# Patient Record
Sex: MALE | Race: WHITE | NOT HISPANIC OR LATINO | Employment: OTHER | URBAN - METROPOLITAN AREA
[De-identification: names, ages, dates, MRNs, and addresses within clinical notes are randomized per-mention and may not be internally consistent; named-entity substitution may affect disease eponyms.]

---

## 2021-09-13 ENCOUNTER — TELEPHONE (OUTPATIENT)
Dept: NEUROSURGERY | Facility: CLINIC | Age: 71
End: 2021-09-13

## 2021-09-13 NOTE — TELEPHONE ENCOUNTER
Provider: NICOLE  Caller: AN RAMIREZ  Relationship to Patient: SELF  Pharmacy:   Phone Number: [phone]  Reason for Call: NEW ISSUE WITH BACK  When was the patient last seen: 2016  When did it start: 1 WEEKS AGO  Where is it located: LOW BACK WITH SCIATICA    PT CALLED TO SCHED APPT WITH DR. RIVERA.  BACK PAIN HAS STATED BACK UP ABOUT 1 WEEKS AGO.  PT HAS NOT HAD ANY NEW IMAGING, HAS NOT SEEN PCP OR ANYONE ELSE FOR THIS.      INFORMED PT THAT BECAUSE IT HAS BEEN OVER 3 YEARS SINCE BEING SEEN BY DR. RIVERA INSURANCE WILL REQUIRE A REFERRAL FROM PCP, ECT.  PT STATES WILL CONTACT PCP TO GET REFERRAL FROM PCP.  WILL TAKE CARE OF THIS TODAY.    I DID NOT GET TO OFFER TO SET UP A REFERAL.      THANK YOU

## 2021-11-30 ENCOUNTER — OFFICE VISIT (OUTPATIENT)
Dept: NEUROSURGERY | Facility: CLINIC | Age: 71
End: 2021-11-30

## 2021-11-30 VITALS — WEIGHT: 171.4 LBS | TEMPERATURE: 97 F | BODY MASS INDEX: 25.39 KG/M2 | HEIGHT: 69 IN

## 2021-11-30 DIAGNOSIS — M51.16 LUMBAR DISC HERNIATION WITH RADICULOPATHY: Primary | ICD-10-CM

## 2021-11-30 DIAGNOSIS — M51.36 DDD (DEGENERATIVE DISC DISEASE), LUMBAR: ICD-10-CM

## 2021-11-30 DIAGNOSIS — M54.9 MECHANICAL BACK PAIN: ICD-10-CM

## 2021-11-30 PROBLEM — M47.816 DEGENERATIVE ARTHRITIS OF LUMBAR SPINE: Status: ACTIVE | Noted: 2021-11-30

## 2021-11-30 PROBLEM — M54.50 CHRONIC LBP: Status: ACTIVE | Noted: 2021-11-30

## 2021-11-30 PROBLEM — R20.0 NUMBNESS IN FEET: Status: ACTIVE | Noted: 2021-11-30

## 2021-11-30 PROBLEM — G89.29 CHRONIC LBP: Status: ACTIVE | Noted: 2021-11-30

## 2021-11-30 PROCEDURE — 99204 OFFICE O/P NEW MOD 45 MIN: CPT | Performed by: NEUROLOGICAL SURGERY

## 2021-11-30 RX ORDER — CILOSTAZOL 100 MG/1
TABLET ORAL
COMMUNITY
Start: 2021-10-29

## 2021-11-30 RX ORDER — HYDRALAZINE HYDROCHLORIDE 25 MG/1
TABLET, FILM COATED ORAL
COMMUNITY
Start: 2021-10-29

## 2021-11-30 RX ORDER — GABAPENTIN 300 MG/1
300 CAPSULE ORAL TAKE AS DIRECTED
Qty: 90 CAPSULE | Refills: 0 | Status: SHIPPED | OUTPATIENT
Start: 2021-11-30 | End: 2021-12-29

## 2021-11-30 RX ORDER — CLOPIDOGREL BISULFATE 75 MG/1
TABLET ORAL
COMMUNITY
Start: 2021-09-02

## 2021-11-30 RX ORDER — BACLOFEN 20 MG/1
TABLET ORAL
COMMUNITY
Start: 2021-11-03

## 2021-11-30 RX ORDER — LABETALOL 100 MG/1
TABLET, FILM COATED ORAL
COMMUNITY
Start: 2021-10-29

## 2021-11-30 RX ORDER — LOSARTAN POTASSIUM 50 MG/1
TABLET ORAL
COMMUNITY
Start: 2021-10-20 | End: 2022-02-09 | Stop reason: DRUGHIGH

## 2021-11-30 NOTE — PROGRESS NOTES
Patient: Mehul Darling II  : 1950    Primary Care Provider: Stephon Harris DO    Requesting Provider: As above        History    Chief Complaint: Low back and left lower extremity pain.    History of Present Illness: Mr. Darling is a 71-year-old retired gentleman who is known to my service.  On 3/8/2010 he underwent left L5-S1 discectomy.  On 10/22/2015 he underwent right L4-5 discectomy.  He now describes a 6-month history of low back pain that extends into the back of his left leg to the foot.  He has some weakness, numbness, burning in his foot.  Symptoms are worse with bending, walking, lying down.  There is no position that makes him feel better.  He denies any bowel or bladder dysfunction.    Review of Systems   Constitutional: Negative for activity change, appetite change, chills, diaphoresis, fatigue, fever and unexpected weight change.   HENT: Positive for dental problem, drooling, sinus pressure, sneezing and sore throat. Negative for congestion, ear discharge, ear pain, facial swelling, hearing loss, mouth sores, nosebleeds, postnasal drip, rhinorrhea, tinnitus, trouble swallowing and voice change.    Eyes: Negative for photophobia, pain, discharge, redness, itching and visual disturbance.   Respiratory: Positive for apnea. Negative for cough, choking, chest tightness, shortness of breath, wheezing and stridor.    Cardiovascular: Negative for chest pain, palpitations and leg swelling.   Gastrointestinal: Positive for diarrhea. Negative for abdominal distention, abdominal pain, anal bleeding, blood in stool, constipation, nausea, rectal pain and vomiting.   Endocrine: Positive for cold intolerance and polydipsia. Negative for heat intolerance, polyphagia and polyuria.   Genitourinary: Negative for decreased urine volume, difficulty urinating, dysuria, enuresis, flank pain, frequency, genital sores, hematuria and urgency.   Musculoskeletal: Positive for back pain. Negative for arthralgias, gait  "problem, joint swelling, myalgias, neck pain and neck stiffness.   Skin: Positive for wound. Negative for color change, pallor and rash.   Allergic/Immunologic: Positive for environmental allergies. Negative for food allergies and immunocompromised state.   Neurological: Negative for dizziness, tremors, seizures, syncope, facial asymmetry, speech difficulty, weakness, light-headedness, numbness and headaches.   Hematological: Negative for adenopathy. Bruises/bleeds easily.   Psychiatric/Behavioral: Negative for agitation, behavioral problems, confusion, decreased concentration, dysphoric mood, hallucinations, self-injury, sleep disturbance and suicidal ideas. The patient is not nervous/anxious and is not hyperactive.    All other systems reviewed and are negative.      The patient's past medical history, past surgical history, family history, and social history have been reviewed at length in the electronic medical record.    Physical Exam:   Temp 97 °F (36.1 °C) (Infrared)   Ht 174 cm (68.5\")   Wt 77.7 kg (171 lb 6.4 oz)   BMI 25.68 kg/m²   CONSTITUTIONAL: Patient is well-nourished, pleasant and appears stated age.  CV: Heart regular rate and rhythm without murmur, rub, or gallop.  PULMONARY: Lungs fields demonstrate some diffuse wheezes.  MUSCULOSKELETAL:  Straight leg raising is negative.  Mahesh's Sign is negative.  ROM in the low back is normal.  Tenderness in the back to palpation is not observed.  NEUROLOGICAL:  Orientation, memory, attention span, language function, and cognition have been examined and are intact.  Strength is intact in the lower extremities to direct testing.  Muscle tone is normal throughout.  Station and gait are normal.  Sensation is intact to light touch testing throughout.  Deep tendon reflexes are difficult to elicit throughout.  Coordination is intact.      Medical Decision Making    Data Review:   (All imaging studies were personally reviewed unless stated otherwise)  MRI of the " lumbar spine dated 10/20/2021 demonstrates diffuse degenerative disc disease and facet arthropathy. Laminotomy defect is noted on the right at L4-5. There is a left-sided laminotomy defect at L5-S1. I believe there is a disc extrusion leftward at the L5-S1 that descends below the disc base.    Diagnosis:   1. Left L5-S1 disc herniation with radiculopathy, recurrent.  2. Lumbar degenerative disc disease.    Treatment Options:   I have referred Mr. Darling to physical therapy and prescribed gabapentin. He will follow-up with me in about a month.       Diagnosis Plan   1. Lumbar disc herniation with radiculopathy     2. Mechanical back pain     3. DDD (degenerative disc disease), lumbar         Scribed for Rolando Hull MD by Marifer Simental CMA on 11/30/2021 13:13 EST       I, Dr. Hull, personally performed the services described in the documentation, as scribed in my presence, and it is both accurate and complete.

## 2021-12-29 ENCOUNTER — OFFICE VISIT (OUTPATIENT)
Dept: NEUROSURGERY | Facility: CLINIC | Age: 71
End: 2021-12-29

## 2021-12-29 VITALS — WEIGHT: 180.2 LBS | BODY MASS INDEX: 26.69 KG/M2 | TEMPERATURE: 97.8 F | HEIGHT: 69 IN

## 2021-12-29 DIAGNOSIS — M51.16 LUMBAR DISC HERNIATION WITH RADICULOPATHY: Primary | ICD-10-CM

## 2021-12-29 DIAGNOSIS — M51.16 LUMBAR DISC HERNIATION WITH RADICULOPATHY: ICD-10-CM

## 2021-12-29 DIAGNOSIS — M51.36 DDD (DEGENERATIVE DISC DISEASE), LUMBAR: ICD-10-CM

## 2021-12-29 PROCEDURE — 99213 OFFICE O/P EST LOW 20 MIN: CPT | Performed by: NEUROLOGICAL SURGERY

## 2021-12-29 RX ORDER — GABAPENTIN 300 MG/1
CAPSULE ORAL
Qty: 90 CAPSULE | Refills: 0 | Status: SHIPPED | OUTPATIENT
Start: 2021-12-29 | End: 2021-12-29 | Stop reason: SDUPTHER

## 2021-12-29 RX ORDER — GABAPENTIN 300 MG/1
300 CAPSULE ORAL 3 TIMES DAILY
Qty: 270 CAPSULE | Refills: 0 | Status: SHIPPED | OUTPATIENT
Start: 2021-12-29 | End: 2022-03-22 | Stop reason: SDUPTHER

## 2021-12-29 NOTE — TELEPHONE ENCOUNTER
Provider:  Dr. Hull  Caller: Patient requested refill through pharmacy  Time of call:   --  Phone #:  [phone]  Surgery:  -  Surgery Date:  -  Last visit: Office Visit with Rolando Hull MD (11/30/2021)      Next visit: Appointment with Rolando Hull MD (12/29/2021)  Today    NANCI:         11/30/2021 Gabapentin 300MG 1950 90 [address] NICOLE [facility] [place] [address]    Reason for call:         Requested Prescriptions     Pending Prescriptions Disp Refills   • gabapentin (NEURONTIN) 300 MG capsule [Pharmacy Med Name: GABAPENTIN 300 MG CAPSULE] 90 capsule      Sig: TAKE ONE CAPSULE BY MOUTH EVERY NIGHT AT BEDTIME FOR 3 DAYS, THEN TAKE ONE CAPSULE TWICE A DAY FOR 3 DAYS, THEN TAKE ONE CAPSULE THREE TIMES A DAY THEREAFTER

## 2021-12-29 NOTE — PROGRESS NOTES
Patient: Mehul Darling  : 1950    Primary Care Provider: Stephon Harris DO    Requesting Provider: As above        History    Chief Complaint: Low back and left lower extremity pain.    History of Present Illness: Mr. Darling is a 71-year-old retired gentleman who is known to my service.  On 3/8/2010 he underwent left L5-S1 discectomy.  On 10/22/2015 he underwent right L4-5 discectomy.  He now describes a 6-month history of low back pain that extends into the back of his left leg to the foot.  He has some weakness, numbness, burning in his foot.  Symptoms are worse with bending, walking, lying down.  Studies demonstrate recurrent disc herniation on the left at L5-S1 that is thought to be the source for his symptoms.  He did not go to therapy given the expense.  However his leg pain is markedly improved.  He believes the gabapentin is helping.    Review of Systems   Constitutional: Negative for activity change, appetite change, chills, diaphoresis, fatigue, fever and unexpected weight change.   HENT: Positive for dental problem, drooling, sinus pressure, sneezing and sore throat. Negative for congestion, ear discharge, ear pain, facial swelling, hearing loss, mouth sores, nosebleeds, postnasal drip, rhinorrhea, tinnitus, trouble swallowing and voice change.    Eyes: Negative for photophobia, pain, discharge, redness, itching and visual disturbance.   Respiratory: Positive for apnea. Negative for cough, choking, chest tightness, shortness of breath, wheezing and stridor.    Cardiovascular: Negative for chest pain, palpitations and leg swelling.   Gastrointestinal: Positive for diarrhea. Negative for abdominal distention, abdominal pain, anal bleeding, blood in stool, constipation, nausea, rectal pain and vomiting.   Endocrine: Positive for cold intolerance and polydipsia. Negative for heat intolerance, polyphagia and polyuria.   Genitourinary: Negative for decreased urine volume, difficulty urinating,  "dysuria, enuresis, flank pain, frequency, genital sores, hematuria, penile discharge, penile pain, penile swelling, scrotal swelling, testicular pain and urgency.   Musculoskeletal: Positive for back pain. Negative for arthralgias, gait problem, joint swelling, myalgias, neck pain and neck stiffness.   Skin: Positive for wound. Negative for color change, pallor and rash.   Allergic/Immunologic: Positive for environmental allergies. Negative for food allergies and immunocompromised state.   Neurological: Negative for dizziness, tremors, seizures, syncope, facial asymmetry, speech difficulty, weakness, light-headedness, numbness and headaches.   Hematological: Negative for adenopathy. Bruises/bleeds easily.   Psychiatric/Behavioral: Negative for agitation, behavioral problems, confusion, decreased concentration, dysphoric mood, hallucinations, self-injury, sleep disturbance and suicidal ideas. The patient is not nervous/anxious and is not hyperactive.    All other systems reviewed and are negative.      The patient's past medical history, past surgical history, family history, and social history have been reviewed at length in the electronic medical record.    Physical Exam:   Temp 97.8 °F (36.6 °C)   Ht 174 cm (68.5\")   Wt 81.7 kg (180 lb 3.2 oz)   BMI 27.00 kg/m²   MUSCULOSKELETAL:  Straight leg raising is negative.  Mahesh's Sign is negative.  Tenderness in the back to palpation is not observed.  NEUROLOGICAL:  Strength is intact in the lower extremities to direct testing.  Muscle tone is normal throughout.  Station and gait are normal.  Sensation is intact to light touch testing throughout.    Medical Decision Making    Data Review:   (All imaging studies were personally reviewed unless stated otherwise)  MRI of the lumbar spine dated 10/20/2021 demonstrates diffuse degenerative disc disease and facet arthropathy. Laminotomy defect is noted on the right at L4-5. There is a left-sided laminotomy defect at " L5-S1. I believe there is a disc extrusion leftward at the L5-S1 that descends below the disc base.       Diagnosis:   1. Left L5-S1 disc herniation with radiculopathy, recurrent.  2. Lumbar degenerative disc disease.    Treatment Options:   Mr. Darling is doing better.  I have renewed his gabapentin.  We will follow-up in our clinic in about 6 weeks.       Diagnosis Plan   1. Lumbar disc herniation with radiculopathy     2. DDD (degenerative disc disease), lumbar         Scribed for Rolando Hull MD by Nadia Barragan [facility] 12/29/2021 14:50 EST      I, Dr. Hull, personally performed the services described in the documentation, as scribed in my presence, and it is both accurate and complete.

## 2022-02-09 ENCOUNTER — OFFICE VISIT (OUTPATIENT)
Dept: NEUROSURGERY | Facility: CLINIC | Age: 72
End: 2022-02-09

## 2022-02-09 VITALS
TEMPERATURE: 97.3 F | DIASTOLIC BLOOD PRESSURE: 60 MMHG | HEIGHT: 69 IN | WEIGHT: 183.6 LBS | SYSTOLIC BLOOD PRESSURE: 120 MMHG | BODY MASS INDEX: 27.19 KG/M2

## 2022-02-09 DIAGNOSIS — M51.36 DDD (DEGENERATIVE DISC DISEASE), LUMBAR: ICD-10-CM

## 2022-02-09 DIAGNOSIS — M54.9 MECHANICAL BACK PAIN: ICD-10-CM

## 2022-02-09 DIAGNOSIS — M51.16 LUMBAR DISC HERNIATION WITH RADICULOPATHY: Primary | ICD-10-CM

## 2022-02-09 PROCEDURE — 99213 OFFICE O/P EST LOW 20 MIN: CPT | Performed by: PHYSICIAN ASSISTANT

## 2022-02-09 RX ORDER — GLIPIZIDE 5 MG/1
TABLET ORAL
COMMUNITY
Start: 2022-01-25

## 2022-02-09 RX ORDER — FERROUS SULFATE 325(65) MG
TABLET ORAL
COMMUNITY
Start: 2022-02-07

## 2022-02-09 RX ORDER — LOSARTAN POTASSIUM 100 MG/1
TABLET ORAL
COMMUNITY
Start: 2022-02-08

## 2022-02-09 NOTE — PATIENT INSTRUCTIONS

## 2022-02-09 NOTE — PROGRESS NOTES
Patient: Mehul Darling  : 1950  GENDER: male    Primary Care Provider: Stephon Harris DO    Requesting Provider: As above      History    Chief Complaint: back and right leg pain     History of Present Illness: Mr. Darling is a 71-year-old retired gentleman who is known to Dr. Hull's service.  On 3/8/2010, patient underwent a left L5-S1 discectomy.  On 10/22/2015 patient underwent a right L4-5 discectomy.  More recently he presented to our service with progressive back and recurrent right leg pain extending along the L5-S1 nerve root distribution.  Symptoms are constant and increased with forward flexion, walking, or with laying flat.  He was referred to physical therapy, however was unable to attend due to financial constraints.  He has been undergoing a home exercise program without lasting benefit.  He is continued on gabapentin 300 mg 3 times daily again without notable change.  He continues to deny left lower extremity involvement, bowel or bladder dysfunction, neurogenic claudication-like symptoms, or overt weakness.  He has no other complaints at this time.    Review of Systems   Constitutional: Negative for activity change, appetite change, chills, diaphoresis, fatigue, fever and unexpected weight change.   HENT: Negative for congestion, dental problem, drooling, ear discharge, ear pain, facial swelling, hearing loss, mouth sores, nosebleeds, postnasal drip, rhinorrhea, sinus pressure, sinus pain, sneezing, sore throat, tinnitus, trouble swallowing and voice change.    Eyes: Negative for photophobia, pain, discharge, redness, itching and visual disturbance.   Respiratory: Negative for apnea, cough, choking, chest tightness, shortness of breath, wheezing and stridor.    Cardiovascular: Negative for chest pain, palpitations and leg swelling.   Gastrointestinal: Negative for abdominal distention, abdominal pain, anal bleeding, blood in stool, constipation, diarrhea, nausea, rectal pain and  vomiting.   Endocrine: Negative for cold intolerance, heat intolerance, polydipsia, polyphagia and polyuria.   Genitourinary: Negative for decreased urine volume, difficulty urinating, dysuria, enuresis, flank pain, frequency, genital sores, hematuria, penile discharge, penile pain, penile swelling, scrotal swelling, testicular pain and urgency.   Musculoskeletal: Positive for back pain. Negative for arthralgias, gait problem, joint swelling, myalgias, neck pain and neck stiffness.   Skin: Negative for color change, pallor, rash and wound.   Allergic/Immunologic: Negative for environmental allergies, food allergies and immunocompromised state.   Neurological: Negative for dizziness, tremors, seizures, syncope, facial asymmetry, speech difficulty, weakness, light-headedness, numbness and headaches.   Hematological: Negative for adenopathy. Does not bruise/bleed easily.   Psychiatric/Behavioral: Negative for agitation, behavioral problems, confusion, decreased concentration, dysphoric mood, hallucinations, self-injury, sleep disturbance and suicidal ideas. The patient is not nervous/anxious and is not hyperactive.        Past Medical History:   Diagnosis Date   • Arthritis    • Back pain    • Depression    • Diabetes mellitus (HCC)     dx 20 years ago- checks fsbs occasionally   • Gallstones    • Hyperlipidemia    • Hypertension    • Kidney stone    • Wears glasses     readers     Past Surgical History:   Procedure Laterality Date   • CARDIAC SURGERY  2017    Quad bypass- Dr. Matt @ [place]   • CHOLECYSTECTOMY N/A 12/7/2016    Procedure: CHOLECYSTECTOMY LAPAROSCOPIC ;  Surgeon: Luis A BROWN MD;  Location: [facility];  Service:    • COLON SURGERY      colon resection   • COLONOSCOPY      2008   • COLOSTOMY CLOSURE     • FEMORAL FEMORAL BYPASS      Oct 2018 (left), November 2018 (right)   • HERNIA REPAIR     • LUMBAR DISCECTOMY Right 10/22/2015    Right L4-5- Dr. Rolando Hull   • LUMBAR DISCECTOMY Left 03/08/2010     "Left L5-S1 discectomy- Dr. Rolando Hull   • ROTATOR CUFF REPAIR Right    • SHOULDER ARTHROSCOPY Right        Current Outpatient Medications:   •  amLODIPine (NORVASC) 10 MG tablet, Take 10 mg by mouth Daily., Disp: , Rfl:   •  baclofen (LIORESAL) 20 MG tablet, , Disp: , Rfl:   •  cilostazol (PLETAL) 100 MG tablet, , Disp: , Rfl:   •  clopidogrel (PLAVIX) 75 MG tablet, , Disp: , Rfl:   •  FeroSul 325 (65 Fe) MG tablet, , Disp: , Rfl:   •  FLUoxetine (PROzac) 40 MG capsule, Take 40 mg by mouth Daily., Disp: , Rfl:   •  gabapentin (NEURONTIN) 300 MG capsule, Take 1 capsule by mouth 3 (Three) Times a Day., Disp: 270 capsule, Rfl: 0  •  glipizide (GLUCOTROL) 5 MG tablet, , Disp: , Rfl:   •  hydrALAZINE (APRESOLINE) 25 MG tablet, , Disp: , Rfl:   •  ibuprofen (ADVIL,MOTRIN) 800 MG tablet, Take 800 mg by mouth Every 6 (Six) Hours As Needed for mild pain (1-3)., Disp: , Rfl:   •  labetalol (NORMODYNE) 100 MG tablet, , Disp: , Rfl:   •  losartan (COZAAR) 100 MG tablet, , Disp: , Rfl:   •  metFORMIN (GLUCOPHAGE) 500 MG tablet, Take 500 mg by mouth 2 (Two) Times a Day With Meals., Disp: , Rfl:   •  potassium chloride (K-DUR) 10 MEQ CR tablet, Take 20 mEq by mouth Daily., Disp: , Rfl:   •  promethazine (PHENERGAN) 25 MG tablet, Take 25 mg by mouth Every 6 (Six) Hours As Needed for nausea or vomiting., Disp: , Rfl:   •  traZODone (DESYREL) 50 MG tablet, Take 50 mg by mouth Every Night., Disp: , Rfl:   •  vitamin D3 125 MCG (5000 UT) capsule capsule, Take 5,000 Units by mouth Daily., Disp: , Rfl:     No Known Allergies    The patient's review of systems, past medical history, past surgical history, family history, and social history have been reviewed at length in the electronic medical record.    Physical Exam:   /60 (BP Location: Left arm, Patient Position: Sitting, Cuff Size: Adult)   Temp 97.3 °F (36.3 °C)   Ht 175.3 cm (69\")   Wt 83.3 kg (183 lb 9.6 oz)   BMI 27.11 kg/m²   CONSTITUTIONAL:   - Patient is " well-nourished  - Pleasant and appears stated age.  PSYCHIATRIC:  - Normal mood and affect  - Behavior is normal.  - Thought content is normal  HENT:   Head: Normocephalic and Atraumatic.   Eyes:     - Pupils are equal, round, and reactive to light.     - EOM are normal.   CV:   - Regular rate and rhythm on palpable radial pulse   - No murmur appreciated   PULMONARY:   - Speaking in full sentences  - No use of accessory muscles   - Breathing is non-labored   - No wheezing   SKIN:  - Clean, dry and intact   MUSCULOSKELETAL:  - Back tenderness to palpation is not observed.   - ROM in back is somewhat limited with flexion/extension.  - Straight leg raise is positive on the right at approximately 60 degrees  - Mahesh's sign is negative   NEUROLOGICAL:  - A&Ox3  - Attention span, language function, and cognition are intact.  - Strength is intact in the upper and lower extremities to direct testing.  - Muscle tone is normal throughout.  - Station and gait are normal.  - Sensation is intact to light touch testing throughout.  - Deep tendon reflexes are 1+ and symmetrical.    - Cardoso's Sign is negative bilaterally.  - No clonus is elicited.  - Coordination is intact.    Patient's Body mass index is 27.11 kg/m². indicating that he is overweight (BMI 25-29.9). Patient's (Body mass index is 27.11 kg/m².) indicates that they are overweight with health conditions that include hypertension, diabetes mellitus and dyslipidemias . Weight is unchanged. BMI is is above average; BMI management plan is completed. We discussed portion control and increasing exercise. .         Medical Decision Making    Data Review:   1. MRI Lumbar Spine (10/20/2021):  Independent review of radiographic imaging demonstrates multilevel degenerative changes and facet arthropathy with postsurgical changes consistent with prior laminotomy defects rightward at L4-5, and leftward at L5-S1.  At L5-S1 there is a possible disc extrusion leftward extending  below the disc space.  However upon further review with Dr. Hull, it is uncertain whether this is appropriate postsurgical scar or disc.    Diagnosis/Treatment Options:  1. Lumbar disc herniation with radiculopathy  2. DDD (degenerative disc disease), lumbar  3. Mechanical back pain  4. BMI 27.0-27.9,adult         Follow up:  Mr. Darling is seen today in follow-up with persistent back and right lower extremity complaints.  At his previous visits he was noted to have back and left lower extremity complaints.  Unfortunately given his current complaints and lack of correlation on imaging studies there is no active role for neurosurgical intervention.  I have offered referral to pain management for consideration of a selective epidural injection or 2.  Patient has declined.  He will continue to observe will follow-up in our office on an as-needed basis moving forward.    Gale Bernabe PA-C   2/9/2022   15:49 EST

## 2022-03-22 DIAGNOSIS — M51.16 LUMBAR DISC HERNIATION WITH RADICULOPATHY: ICD-10-CM

## 2022-03-22 RX ORDER — GABAPENTIN 300 MG/1
300 CAPSULE ORAL 3 TIMES DAILY
Qty: 270 CAPSULE | Refills: 0 | Status: SHIPPED | OUTPATIENT
Start: 2022-03-22

## 2022-03-22 NOTE — TELEPHONE ENCOUNTER
Provider:  Kurtis  Caller: pharmacy FAX     Phone #:  [phone]  Surgery:  ns  Surgery Date: na   Last visit:   2-9-2022  Next visit: na    Reason for call:     Pharmacy has faxed over a request for Gabapentin. I have pended if approved. Please Advise. Thank you.      NANCI: 11/30/2021 Gabapentin 300MG 1950 90 30 Rolando Hull [facility]  12/29/2021 Gabapentin 300MG 1950 90 15 Jodie Wade [facility]  12/31/2021 Gabapentin 300MG 1950 270 90 Rolando Hull [facility],  [facility]                Requested Prescriptions     Pending Prescriptions Disp Refills   • gabapentin (NEURONTIN) 300 MG capsule 270 capsule 0     Sig: Take 1 capsule by mouth 3 (Three) Times a Day.